# Patient Record
(demographics unavailable — no encounter records)

---

## 2024-10-14 NOTE — REASON FOR VISIT
[Patient preference] : as per patient preference [Continuing, patient not seen in-person within last 12 months (provide details below)] : Telehealth services are continuing, patient not seen in-person within last 12 months.  [Telehealth (audio & video) - Individual/Group] : This visit was provided via telehealth using real-time 2-way audio visual technology. [Other Location: e.g. Home (Enter Location, City,State)___] : The provider was located at [unfilled]. [Home] : The patient, [unfilled], was located at home, [unfilled], at the time of the visit. [Patient's space is appropriate for telehealth and maintains privacy/confidentiality.] : Patient's space is appropriate for telehealth and maintains privacy/confidentiality. [FreeTextEntry4] : 8315 [FreeTextEntry5] : 6849 [Patient with collateral] : Patient with collateral  [Mother] : mother [TextBox_17] : Diego [FreeTextEntry1] : Complicated Grief

## 2024-10-14 NOTE — PHYSICAL EXAM
[Cooperative] : cooperative [Depressed] : depressed [Full] : full [Clear] : clear [Average] : average [WNL] : within normal limits

## 2024-10-14 NOTE — END OF VISIT
[Individual Psychotherapy for 53+ minutes] : Individual Psychotherapy for 53+ minutes  [Teletherapy Service Provided] : The services provided in this session were delivered via tele-therapy [FreeTextEntry3] : Home- [place] [FreeTextEntry5] : Home of [facility]

## 2024-10-14 NOTE — PLAN
[FreeTextEntry2] : 1. Learn and utilize coping skills daily  2. Process grief  3. Therapist will hold space for pt to process  [Supportive Therapy] : Supportive Therapy [Other: ____] : [unfilled] [de-identified] : Therapist conducted telehealth session. Pt gave consent to utilize teledoc. Pt was cooperative and forthcoming with information. Pt reported that she feels much more grounded and at ease. Pt wanted to check in as last week was difficult for pt as it was her one year anniversary of  дмитрий's proposal. Therapist and pt went over her current dating life and how she is trying to create boundaries with potential love interests. Processed what dating meant for her grief and her exes family. Continue to work with pt as needed basis as pt reports wanting more of a drop in service.  Denies s/i, h/i and/or plan. Denies av hallucinations

## 2024-12-03 NOTE — HISTORY OF PRESENT ILLNESS
[FreeTextEntry1] : 25yo last her fiance last year he  in the shower, she has been active recently got a uti treated with macrobid, than a yeast infection treated with one diflucan. and today she feels like she has alot of dc and has an odor.  she is on jean marie and is hoping it is not the pill htat is effecting her vagina PH [Currently Active] : currently active [Yes] : Yes

## 2024-12-03 NOTE — PHYSICAL EXAM
[Chaperone Declined] : Patient declined chaperone [Appropriately responsive] : appropriately responsive [Alert] : alert [No Acute Distress] : no acute distress [No Lymphadenopathy] : no lymphadenopathy [Regular Rate Rhythm] : regular rate rhythm [No Murmurs] : no murmurs [Clear to Auscultation B/L] : clear to auscultation bilaterally [Soft] : soft [Non-tender] : non-tender [Non-distended] : non-distended [No HSM] : No HSM [No Lesions] : no lesions [No Mass] : no mass [Oriented x3] : oriented x3 [Examination Of The Breasts] : a normal appearance [No Masses] : no breast masses were palpable [Labia Majora] : normal [Labia Minora] : normal [Normal] : normal [Uterine Adnexae] : normal [FreeTextEntry4] : thick discharge, + whiff test and yellowish dc

## 2024-12-03 NOTE — DISCUSSION/SUMMARY
[FreeTextEntry1] : metronidazol po, also diflucan called in  told pt to call office and get test results  continue oc  no smoking, RBAD . Discussed the risks of DVT and blood clots,strokes  good and bad side effects of the pill discussed and instructions on how to take pills and when to use back up. Encouraged exercise , good diet filled with,plant based foods, calcium and vit.D.rtn 6 months. Discussed SBE, Discussed the NIH suggests minimum of 2.5 hours of exercise a week  Answered any questions she may have.

## 2025-07-03 NOTE — DISCUSSION/SUMMARY
[FreeTextEntry1] : disc poss using a diva cup instead of pads.  take the flagel for bad odor  she has diflucan if she has an itch  she has been using uqura/she occ uses the post coital macrobid I disc rtn after treatment for a culture she has no sx today and has her mesnes and would prefere not to have an exam

## 2025-07-03 NOTE — HISTORY OF PRESENT ILLNESS
[FreeTextEntry1] : 28 yo here to disc sx of a uti. She states that in the middle of her cycle she has sx of a uti, she went to urgent care had leuks they treated her no improvement of sx, the culture ids negative. at that time of her cycle she also has an itch and her partner says she has a bad odor. In dec she had both yeast and BV on her pap she doesn't recall taking meds.  SHe now has been with this partner for 3 months but on and off in the last year. today she has no sx.  she also feels she gets irritated from the pads and linners

## 2025-07-03 NOTE — PHYSICAL EXAM
[Chaperone Declined] : Chaperone offered however refused by patient, [Appropriately responsive] : appropriately responsive [Alert] : alert [No Acute Distress] : no acute distress [Oriented x3] : oriented x3